# Patient Record
Sex: FEMALE | Race: WHITE | NOT HISPANIC OR LATINO | Employment: OTHER | URBAN - METROPOLITAN AREA
[De-identification: names, ages, dates, MRNs, and addresses within clinical notes are randomized per-mention and may not be internally consistent; named-entity substitution may affect disease eponyms.]

---

## 2023-11-09 ENCOUNTER — APPOINTMENT (OUTPATIENT)
Dept: PRIMARY CARE | Facility: CLINIC | Age: 83
End: 2023-11-09
Payer: MEDICARE

## 2023-12-12 ENCOUNTER — LAB (OUTPATIENT)
Dept: LAB | Facility: LAB | Age: 83
End: 2023-12-12
Payer: MEDICARE

## 2023-12-12 ENCOUNTER — OFFICE VISIT (OUTPATIENT)
Dept: PRIMARY CARE | Facility: CLINIC | Age: 83
End: 2023-12-12
Payer: MEDICARE

## 2023-12-12 VITALS
HEART RATE: 86 BPM | DIASTOLIC BLOOD PRESSURE: 60 MMHG | RESPIRATION RATE: 18 BRPM | BODY MASS INDEX: 22.47 KG/M2 | OXYGEN SATURATION: 98 % | SYSTOLIC BLOOD PRESSURE: 94 MMHG | WEIGHT: 126.8 LBS | HEIGHT: 63 IN

## 2023-12-12 DIAGNOSIS — E03.9 HYPOTHYROIDISM, UNSPECIFIED TYPE: ICD-10-CM

## 2023-12-12 DIAGNOSIS — E44.0 MODERATE PROTEIN-CALORIE MALNUTRITION (MULTI): Primary | ICD-10-CM

## 2023-12-12 DIAGNOSIS — E44.0 MODERATE PROTEIN-CALORIE MALNUTRITION (MULTI): ICD-10-CM

## 2023-12-12 DIAGNOSIS — R93.2 ABNORMAL MRI, LIVER: ICD-10-CM

## 2023-12-12 DIAGNOSIS — R63.4 WEIGHT LOSS: ICD-10-CM

## 2023-12-12 DIAGNOSIS — A41.51 SEPSIS DUE TO ESCHERICHIA COLI, UNSPECIFIED WHETHER ACUTE ORGAN DYSFUNCTION PRESENT (MULTI): ICD-10-CM

## 2023-12-12 DIAGNOSIS — F03.A0 MILD DEMENTIA, UNSPECIFIED DEMENTIA TYPE, UNSPECIFIED WHETHER BEHAVIORAL, PSYCHOTIC, OR MOOD DISTURBANCE OR ANXIETY (MULTI): ICD-10-CM

## 2023-12-12 PROBLEM — Z95.2 S/P TAVR (TRANSCATHETER AORTIC VALVE REPLACEMENT): Status: ACTIVE | Noted: 2023-06-14

## 2023-12-12 PROBLEM — I27.20 PULMONARY HTN (MULTI): Status: ACTIVE | Noted: 2017-03-22

## 2023-12-12 PROBLEM — E53.8 VITAMIN B12 DEFICIENCY: Status: ACTIVE | Noted: 2023-06-29

## 2023-12-12 PROBLEM — E78.5 HYPERLIPIDEMIA: Status: ACTIVE | Noted: 2023-06-29

## 2023-12-12 PROBLEM — I77.1 SUBCLAVIAN ARTERY STENOSIS, RIGHT (CMS-HCC): Status: ACTIVE | Noted: 2023-11-13

## 2023-12-12 PROBLEM — I10 HTN (HYPERTENSION), BENIGN: Status: ACTIVE | Noted: 2023-06-29

## 2023-12-12 PROBLEM — M85.851 OSTEOPENIA OF NECK OF RIGHT FEMUR: Status: ACTIVE | Noted: 2023-06-29

## 2023-12-12 PROBLEM — I35.0 SEVERE AORTIC STENOSIS: Status: ACTIVE | Noted: 2017-12-04

## 2023-12-12 LAB
ALBUMIN SERPL BCP-MCNC: 3.8 G/DL (ref 3.4–5)
ALP SERPL-CCNC: 229 U/L (ref 33–136)
ALT SERPL W P-5'-P-CCNC: 14 U/L (ref 7–45)
ANION GAP SERPL CALC-SCNC: 14 MMOL/L (ref 10–20)
AST SERPL W P-5'-P-CCNC: 19 U/L (ref 9–39)
BILIRUB SERPL-MCNC: 0.5 MG/DL (ref 0–1.2)
BUN SERPL-MCNC: 9 MG/DL (ref 6–23)
CALCIUM SERPL-MCNC: 9 MG/DL (ref 8.6–10.3)
CHLORIDE SERPL-SCNC: 91 MMOL/L (ref 98–107)
CO2 SERPL-SCNC: 25 MMOL/L (ref 21–32)
CREAT SERPL-MCNC: 0.81 MG/DL (ref 0.5–1.05)
GFR SERPL CREATININE-BSD FRML MDRD: 72 ML/MIN/1.73M*2
GLUCOSE SERPL-MCNC: 90 MG/DL (ref 74–99)
POTASSIUM SERPL-SCNC: 4.9 MMOL/L (ref 3.5–5.3)
PROT SERPL-MCNC: 6.5 G/DL (ref 6.4–8.2)
SODIUM SERPL-SCNC: 125 MMOL/L (ref 136–145)
TSH SERPL-ACNC: 9.95 MIU/L (ref 0.44–3.98)

## 2023-12-12 PROCEDURE — 84443 ASSAY THYROID STIM HORMONE: CPT

## 2023-12-12 PROCEDURE — 1036F TOBACCO NON-USER: CPT | Performed by: NURSE PRACTITIONER

## 2023-12-12 PROCEDURE — 3074F SYST BP LT 130 MM HG: CPT | Performed by: NURSE PRACTITIONER

## 2023-12-12 PROCEDURE — 3078F DIAST BP <80 MM HG: CPT | Performed by: NURSE PRACTITIONER

## 2023-12-12 PROCEDURE — 36415 COLL VENOUS BLD VENIPUNCTURE: CPT

## 2023-12-12 PROCEDURE — 80053 COMPREHEN METABOLIC PANEL: CPT

## 2023-12-12 PROCEDURE — 1159F MED LIST DOCD IN RCRD: CPT | Performed by: NURSE PRACTITIONER

## 2023-12-12 PROCEDURE — 99204 OFFICE O/P NEW MOD 45 MIN: CPT | Performed by: NURSE PRACTITIONER

## 2023-12-12 PROCEDURE — 1160F RVW MEDS BY RX/DR IN RCRD: CPT | Performed by: NURSE PRACTITIONER

## 2023-12-12 RX ORDER — ASPIRIN 81 MG/1
81 TABLET ORAL DAILY
COMMUNITY
Start: 2023-06-09

## 2023-12-12 RX ORDER — FEEDING PUMP
EACH MISCELLANEOUS
Qty: 237 ML | Refills: 11 | Status: SHIPPED | OUTPATIENT
Start: 2023-12-12

## 2023-12-12 RX ORDER — ACETAMINOPHEN 500 MG
1000 TABLET ORAL 2 TIMES DAILY
COMMUNITY

## 2023-12-12 RX ORDER — ACETAMINOPHEN 500 MG
1 TABLET ORAL
COMMUNITY
Start: 2015-07-02

## 2023-12-12 RX ORDER — AMLODIPINE BESYLATE 2.5 MG/1
2.5 TABLET ORAL DAILY
COMMUNITY
Start: 2023-11-20 | End: 2023-12-12 | Stop reason: ALTCHOICE

## 2023-12-12 RX ORDER — LEVOTHYROXINE SODIUM 112 UG/1
112 TABLET ORAL
COMMUNITY
Start: 2021-11-30

## 2023-12-12 RX ORDER — SPIRONOLACTONE 25 MG
TABLET ORAL
COMMUNITY
Start: 2023-12-01 | End: 2023-12-12 | Stop reason: ALTCHOICE

## 2023-12-12 RX ORDER — DULOXETINE HYDROCHLORIDE 30 MG/1
30 CAPSULE, DELAYED RELEASE ORAL DAILY
COMMUNITY

## 2023-12-12 RX ORDER — DOCUSATE SODIUM 100 MG/1
100 CAPSULE, LIQUID FILLED ORAL 2 TIMES DAILY
COMMUNITY
Start: 2023-06-08

## 2023-12-12 RX ORDER — BACLOFEN 10 MG/1
10 TABLET ORAL 2 TIMES DAILY
COMMUNITY
End: 2023-12-12 | Stop reason: ALTCHOICE

## 2023-12-12 RX ORDER — LISINOPRIL 40 MG/1
40 TABLET ORAL
COMMUNITY
Start: 2023-11-20 | End: 2023-12-12 | Stop reason: ALTCHOICE

## 2023-12-12 RX ORDER — UBIDECARENONE 75 MG
500 CAPSULE ORAL
COMMUNITY

## 2023-12-12 RX ORDER — SIMVASTATIN 40 MG
40 TABLET ORAL NIGHTLY
COMMUNITY

## 2023-12-12 RX ORDER — OXCARBAZEPINE 300 MG/1
300 TABLET, FILM COATED ORAL 2 TIMES DAILY
COMMUNITY
Start: 2013-07-24

## 2023-12-12 RX ORDER — DONEPEZIL HYDROCHLORIDE 5 MG/1
5 TABLET, FILM COATED ORAL NIGHTLY
Qty: 30 TABLET | Refills: 5 | Status: SHIPPED
Start: 2023-12-12 | End: 2024-03-20 | Stop reason: WASHOUT

## 2023-12-12 RX ORDER — OMEPRAZOLE 20 MG/1
20 CAPSULE, DELAYED RELEASE ORAL
COMMUNITY

## 2023-12-12 NOTE — PROGRESS NOTES
Subjective   Patient ID: Alda Ray is a 83 y.o. female who presents for Follow-up (Alda is in today to discuss memory issues and recent hospital visits. She has a friend present during today's visit. She is a resident of [facility] and the nurses there want to know if she should cont. On HTN meds.   ).    83-year-old female known to me from a previous private practice.    Here with POA.  Had cog testing at [facility] by me and PCP, Dr Osuna.  Was diagnosed with mild cognitive impairment/dementia.   Recently was in the Hospital x 2 weeks. UTI/ sepsis. Was admitted to [place] [facility], then 2 weeks at SNF.   While in pt found small area on  liver  In- Pt Active Problems:    LFT elevation    Severe protein-calorie malnutrition (HCC)    Thickening of wall of gallbladder with pericholecystic fluid    Sepsis due to Escherichia coli (HCC)    RUQ pain    Subclavian artery stenosis, right (HCC)    Common bile duct dilation    Abnormal MRI, liver    She had a significant medical workup including autoimmune testing.  Patient and power of  have no idea why any of that was completed and what came about.  I reviewed reviewed the DNR status with the patient and she has a living will she thought she was DNR she is not sure.  She will review the forms when she gets back to the [facility] which is the assisted living that she moved into earlier this year.  She was doing very well prior to the hospitalization eating and drinking staying active but she became quickly ill from the urinary tract infection.  She does not remember much of her admission secondary to how sick she was.    She is voicing that she does not want anything heroic she does not want unnecessary testing done.  Her quality of life is very important to her.    Medication list from the [facility] was reviewed and updated.  Her power of  would like for her to try Aricept which is a medication we discussed previously to see if we can slow down her  "cognitive impairment.  Risk versus benefit and possible side effects was reviewed    Fear of anesthesia as in the past had difficulty recovering. Anesthesiologist explained it would be low risk but can impact cognitive impairment. Specialist stated this would be the start of the work up. Pt chose not to proceed. She called Dr Osuna to look at the MRI- no way to know unless BX was done.          Review of Systems   All other systems reviewed and are negative.      Objective   BP 94/60   Pulse 86   Resp 18   Ht 1.6 m (5' 3\")   Wt 57.5 kg (126 lb 12.8 oz)   SpO2 98%   BMI 22.46 kg/m²     Physical Exam  Constitutional:       Appearance: Normal appearance.   Cardiovascular:      Rate and Rhythm: Normal rate and regular rhythm.   Pulmonary:      Effort: Pulmonary effort is normal.      Breath sounds: Normal breath sounds.   Musculoskeletal:         General: Normal range of motion.   Skin:     General: Skin is warm and dry.   Neurological:      General: No focal deficit present.      Mental Status: She is alert and oriented to person, place, and time.   Psychiatric:         Mood and Affect: Mood normal.         Assessment/Plan   Diagnoses and all orders for this visit:  Moderate protein-calorie malnutrition (CMS/HCC)  Comments:  Add Ensure 1 can twice a day if she does not eat the meal.  Orders:  -     nutritional drink (Ensure High Protein) liquid; Ingest 1 can 2 times ad ay if you do not eat a full meal  -     Comprehensive Metabolic Panel; Future  Weight loss  Comments:  Increase p.o. intake.  The weight loss has been since the hospitalization  Orders:  -     nutritional drink (Ensure High Protein) liquid; Ingest 1 can 2 times ad ay if you do not eat a full meal  -     Comprehensive Metabolic Panel; Future  -     Thyroid Stimulating Hormone; Future  Hypothyroidism, unspecified type  Comments:  Update TSH she is on levothyroxine  Orders:  -     Thyroid Stimulating Hormone; Future  Mild dementia, unspecified dementia " type, unspecified whether behavioral, psychotic, or mood disturbance or anxiety (CMS/HCC)  Comments:  I am okay with trying Aricept 5 mg p.o. nightly.  Follow-up in 3 months.  I would like a Mini-Mental exam done in approximately 6 to 8 weeks from [facility]  Orders:  -     donepezil (Aricept) 5 mg tablet; Take 1 tablet (5 mg) by mouth once daily at bedtime.  Sepsis due to Escherichia coli, unspecified whether acute organ dysfunction present (CMS/HCC)  Comments:  Resolved.  Posthospitalization.  Update CMP secondary to hyponatremia  Abnormal MRI, liver  Comments:  POA and patient does not want to look any further into this.  She is clinically asymptomatic.  Other orders  -     Follow Up In Primary Care - Other; Future

## 2023-12-13 NOTE — RESULT ENCOUNTER NOTE
Please call her poa- tsh remains elevated she will need an increase in the levothyroxine- 1 tab 6 days, 2 tabs on the 7th day. Repeat TSH 3 months when I see her next.   Her sodium is still low but improved, she needs to increase her fluids as we discussed in office.   ALK phos elevated but the liver was looked into during her hospitalization, we are not doing any further testing. The rest of her liver enzymes look good.

## 2024-03-14 ENCOUNTER — OFFICE VISIT (OUTPATIENT)
Dept: PRIMARY CARE | Facility: CLINIC | Age: 84
End: 2024-03-14
Payer: MEDICARE

## 2024-03-14 VITALS
DIASTOLIC BLOOD PRESSURE: 80 MMHG | WEIGHT: 123.6 LBS | HEIGHT: 63 IN | SYSTOLIC BLOOD PRESSURE: 112 MMHG | OXYGEN SATURATION: 98 % | BODY MASS INDEX: 21.9 KG/M2 | HEART RATE: 87 BPM | RESPIRATION RATE: 18 BRPM

## 2024-03-14 DIAGNOSIS — E87.1 HYPONATREMIA: ICD-10-CM

## 2024-03-14 DIAGNOSIS — E03.9 ACQUIRED HYPOTHYROIDISM: Primary | Chronic | ICD-10-CM

## 2024-03-14 DIAGNOSIS — N30.00 ACUTE CYSTITIS WITHOUT HEMATURIA: ICD-10-CM

## 2024-03-14 PROCEDURE — 99214 OFFICE O/P EST MOD 30 MIN: CPT | Performed by: NURSE PRACTITIONER

## 2024-03-14 PROCEDURE — 1159F MED LIST DOCD IN RCRD: CPT | Performed by: NURSE PRACTITIONER

## 2024-03-14 PROCEDURE — 3079F DIAST BP 80-89 MM HG: CPT | Performed by: NURSE PRACTITIONER

## 2024-03-14 PROCEDURE — 1036F TOBACCO NON-USER: CPT | Performed by: NURSE PRACTITIONER

## 2024-03-14 PROCEDURE — 3074F SYST BP LT 130 MM HG: CPT | Performed by: NURSE PRACTITIONER

## 2024-03-14 PROCEDURE — 1160F RVW MEDS BY RX/DR IN RCRD: CPT | Performed by: NURSE PRACTITIONER

## 2024-03-20 PROBLEM — A41.51 SEPSIS DUE TO ESCHERICHIA COLI (MULTI): Status: RESOLVED | Noted: 2023-11-10 | Resolved: 2024-03-20

## 2024-03-20 PROBLEM — E87.1 HYPONATREMIA: Status: ACTIVE | Noted: 2024-03-20

## 2024-03-20 NOTE — PROGRESS NOTES
"Subjective   Patient ID: Alda Ray is a 83 y.o. female who presents for Follow-up (Alda is in today for a F/U, POA present during today's visit. Would like to discuss orders for PT. ).    83-year-old daughter here she will check up since recently being in hospital for urinary tract.  She has a history of urinary tract action that are really asymptomatic until she gets very sick.  During her hospitalization she saw multiple specialists including nephrology for hyponatremia which resolved upon discharge.  She does have chronic anemia.  She is DNR comfort care.  They really do not want a lot done unnecessarily.  She continues to live in assisted living at [facility].  She has been seeing the nurse practitioner there occasionally but she wants to continue coming here for routine follow-ups.  We did do a urine in office today it was negative other than a small amount of leukocytes.  She brought a list of her vital signs including blood pressure and heart rate which have been pretty stable.  Blood pressure on the low normal side.  No falls  Blood work also reviewed from March 11-sodium 132, BUN 16, creatinine 0.7, albumin 4.0.   She also brought in med list which is different than the previous med list she states that some of her medications were changed at the facility secondary to medication availability.  For dementia and anxiety she was recently changed to Cymbalta 30 mg p.o. every morning, Trileptal 300 mg p.o. twice daily.  She can get hydroxyzine 25 mg p.o. every 6 hours as needed anxiety patient states she has not really required.  She is on levothyroxine for hypothyroid without an updated TSH.  She states she has been eating and drinking much better with the occasional Ensure         Review of Systems    Objective   /80   Pulse 87   Resp 18   Ht 1.6 m (5' 3\")   Wt 56.1 kg (123 lb 9.6 oz)   SpO2 98%   BMI 21.89 kg/m²     Physical Exam  Constitutional:       Appearance: Normal " appearance.   Cardiovascular:      Rate and Rhythm: Normal rate and regular rhythm.   Pulmonary:      Effort: Pulmonary effort is normal.      Breath sounds: Normal breath sounds.   Neurological:      Mental Status: She is alert and oriented to person, place, and time.      Comments: She is oriented x 3 today occasionally forgets details about something and will ask her daughter otherwise she is able to pretty much answer every question.   Psychiatric:         Mood and Affect: Mood normal.         Assessment/Plan   Diagnoses and all orders for this visit:  Acquired hypothyroidism  Comments:  She needs updated TSH and free T4 which can be checked along with a BMP in approximately 2 weeks.  Facility can fax the results  Acute cystitis without hematuria  Comments:  Since she just got out of the hospital and there is still leukocytes in the urine I am going to add Keflex 500 mg p.o. twice daily x 3 d  Hyponatremia  Comments:  Update BMP with the next set of blood work.  Other orders  -     Follow Up In Primary Care - Other  -     Follow Up In Primary Care - Established; Future       Patient will follow-up every 6 months per her request.  Her and her daughter are agreeable with our plan.  Call as needed

## 2024-04-09 ENCOUNTER — TELEPHONE (OUTPATIENT)
Dept: PRIMARY CARE | Facility: CLINIC | Age: 84
End: 2024-04-09
Payer: MEDICARE

## 2024-04-09 NOTE — TELEPHONE ENCOUNTER
Called and lvm with the nurse at [facility]. I advised the nurse elyssa per Cherelle that the patients sodium is low and to stop the Trileptal. I did give the nurse elyssa our phone number and fax number.

## 2024-04-09 NOTE — PROGRESS NOTES
Assisted living sent lab report. Na 129. Much lower than previous with a HX hyponatremia. She was started on Trileptal by the NP at the facility.   I will recommend stopping the trileptal and repeat BMP in 2-4 weeks.    [phone]

## 2024-09-17 ENCOUNTER — APPOINTMENT (OUTPATIENT)
Dept: PRIMARY CARE | Facility: CLINIC | Age: 84
End: 2024-09-17
Payer: MEDICARE

## 2024-09-17 VITALS
HEIGHT: 63 IN | WEIGHT: 114 LBS | BODY MASS INDEX: 20.2 KG/M2 | DIASTOLIC BLOOD PRESSURE: 78 MMHG | HEART RATE: 94 BPM | SYSTOLIC BLOOD PRESSURE: 122 MMHG | OXYGEN SATURATION: 95 %

## 2024-09-17 DIAGNOSIS — Z00.00 MEDICARE ANNUAL WELLNESS VISIT, SUBSEQUENT: Primary | ICD-10-CM

## 2024-09-17 DIAGNOSIS — Z00.00 ROUTINE GENERAL MEDICAL EXAMINATION AT HEALTH CARE FACILITY: ICD-10-CM

## 2024-09-17 PROCEDURE — 3078F DIAST BP <80 MM HG: CPT | Performed by: NURSE PRACTITIONER

## 2024-09-17 PROCEDURE — 3074F SYST BP LT 130 MM HG: CPT | Performed by: NURSE PRACTITIONER

## 2024-09-17 PROCEDURE — 1170F FXNL STATUS ASSESSED: CPT | Performed by: NURSE PRACTITIONER

## 2024-09-17 PROCEDURE — 1158F ADVNC CARE PLAN TLK DOCD: CPT | Performed by: NURSE PRACTITIONER

## 2024-09-17 PROCEDURE — 1160F RVW MEDS BY RX/DR IN RCRD: CPT | Performed by: NURSE PRACTITIONER

## 2024-09-17 PROCEDURE — 1159F MED LIST DOCD IN RCRD: CPT | Performed by: NURSE PRACTITIONER

## 2024-09-17 PROCEDURE — 1123F ACP DISCUSS/DSCN MKR DOCD: CPT | Performed by: NURSE PRACTITIONER

## 2024-09-17 PROCEDURE — G0439 PPPS, SUBSEQ VISIT: HCPCS | Performed by: NURSE PRACTITIONER

## 2024-09-17 RX ORDER — PANTOPRAZOLE SODIUM 40 MG/1
40 TABLET, DELAYED RELEASE ORAL
COMMUNITY
Start: 2024-09-06 | End: 2025-09-06

## 2024-09-17 ASSESSMENT — ACTIVITIES OF DAILY LIVING (ADL)
TAKING_MEDICATION: INDEPENDENT
MANAGING_FINANCES: INDEPENDENT
BATHING: INDEPENDENT
DRESSING: INDEPENDENT
DOING_HOUSEWORK: INDEPENDENT
GROCERY_SHOPPING: INDEPENDENT

## 2024-09-17 ASSESSMENT — PATIENT HEALTH QUESTIONNAIRE - PHQ9
2. FEELING DOWN, DEPRESSED OR HOPELESS: NOT AT ALL
SUM OF ALL RESPONSES TO PHQ9 QUESTIONS 1 AND 2: 0
1. LITTLE INTEREST OR PLEASURE IN DOING THINGS: NOT AT ALL
2. FEELING DOWN, DEPRESSED OR HOPELESS: NOT AT ALL
1. LITTLE INTEREST OR PLEASURE IN DOING THINGS: NOT AT ALL
SUM OF ALL RESPONSES TO PHQ9 QUESTIONS 1 AND 2: 0

## 2024-09-17 ASSESSMENT — ENCOUNTER SYMPTOMS
LOSS OF SENSATION IN FEET: 0
OCCASIONAL FEELINGS OF UNSTEADINESS: 1
DEPRESSION: 0

## 2024-09-17 NOTE — PATIENT INSTRUCTIONS
Ask Milagro OREILLY about Remeron for appetite  We will transfer all primary care to Milagro BAJWA.   Keep your smile and stay beautiful!!!!!!

## 2024-09-17 NOTE — PROGRESS NOTES
"Subjective   Patient ID: Alda Ray is a 83 y.o. female who presents for Medicare Annual Wellness Visit Subsequent (Patient is here today for a medicare wellness. Patient is residing at [facility]. ).    83 year old female accompanied by her friend/caregiver. Here for annual medicare wellness exam  Since our last OV she fell, Hip FX. Did not have surgery. Ambulating with a walker. Still in PT. Occasional wheelchair but trying to use the rollator. Aware that she needs to walk more. Still has some pain with ambulation  She resides at [facility]. The NP is Milagro Gomez. Med list accompanied with pt.   Dtr has some questions re: weight loss- 10 lb in 6 months. Concerned with the weight loss.   Meds on our list do not match the facility med list. This was reviewed with pt and her caregiver  Due to age there are no current recommendations for screening.          Review of Systems    Objective   /78   Pulse 94   Ht 1.6 m (5' 3\")   Wt 51.7 kg (114 lb)   SpO2 95%   BMI 20.19 kg/m²     Physical Exam  Constitutional:       Appearance: Normal appearance.   Cardiovascular:      Rate and Rhythm: Normal rate and regular rhythm.      Pulses: Normal pulses.      Heart sounds: Normal heart sounds.   Pulmonary:      Effort: Pulmonary effort is normal.      Breath sounds: Normal breath sounds.   Musculoskeletal:         General: Normal range of motion.   Skin:     General: Skin is warm and dry.   Neurological:      General: No focal deficit present.      Mental Status: She is alert. Mental status is at baseline.      Comments: Memory is ok. There are things she cannot remember.   Year 2024  Month September   1940  Breakfast this am- OJ, oatmeal br sugar, milk, raisin bread, milk   Psychiatric:         Mood and Affect: Mood normal.         Behavior: Behavior normal.         Thought Content: Thought content normal.         Assessment/Plan   Diagnoses and all orders for this visit:  Medicare annual " wellness visit, subsequent  Comments:  we will transfer all primary care to the [facility]. Due to age there are no recommendations for screening  DNR-CCA  Routine general medical examination at health care facility  Other orders  -     Follow Up In Primary Care - Established